# Patient Record
Sex: MALE | Race: WHITE | NOT HISPANIC OR LATINO | Employment: OTHER | URBAN - METROPOLITAN AREA
[De-identification: names, ages, dates, MRNs, and addresses within clinical notes are randomized per-mention and may not be internally consistent; named-entity substitution may affect disease eponyms.]

---

## 2023-12-06 ENCOUNTER — TELEPHONE (OUTPATIENT)
Age: 68
End: 2023-12-06

## 2023-12-06 NOTE — TELEPHONE ENCOUNTER
Is January 12th appropriate for this patient, elevated PSA. Decision tree states within 2 weeks but nothing available.

## 2023-12-06 NOTE — TELEPHONE ENCOUNTER
I called and confirmed appt with patient. Date, time, and location has been reviewed. Patient was given fax number to get records sent here. Clerical will continue to watch for records.

## 2023-12-06 NOTE — TELEPHONE ENCOUNTER
New Patient    What is the reason for the patient’s appointment?: NP- Elevated PSA 17.39    What office location does the patient prefer?: [facility] if possible     Does patient have Imaging/Lab Results:    Patient states he had his PSA done on 12/4/23 and his PSA was 17.39    Have patient records been requested?:  If No, are the records showing in Epic:  Patient will be having records faxed in ASAP    HISTORY:   Has the patient had any previous Urologist(s)?: No         Denial Instructions   Send patient to Nurse Triage. I did attempt to schedule patient but got denial above per tree. Patient is going to be calling PCP for records to be sent over ASAP. Please monitor for records and schedule patient in appropriate time frame.      CB: [phone]

## 2023-12-08 NOTE — TELEPHONE ENCOUNTER
I called and let patient know we still did not get records yet. He will call them and let them know.

## 2023-12-11 ENCOUNTER — OFFICE VISIT (OUTPATIENT)
Dept: UROLOGY | Facility: CLINIC | Age: 68
End: 2023-12-11
Payer: MEDICARE

## 2023-12-11 VITALS
HEART RATE: 63 BPM | SYSTOLIC BLOOD PRESSURE: 148 MMHG | BODY MASS INDEX: 27.67 KG/M2 | DIASTOLIC BLOOD PRESSURE: 80 MMHG | WEIGHT: 182 LBS | OXYGEN SATURATION: 97 %

## 2023-12-11 DIAGNOSIS — R97.20 ELEVATED PSA: Primary | ICD-10-CM

## 2023-12-11 DIAGNOSIS — N20.0 NEPHROLITHIASIS: ICD-10-CM

## 2023-12-11 DIAGNOSIS — N41.9 PROSTATITIS, UNSPECIFIED PROSTATITIS TYPE: ICD-10-CM

## 2023-12-11 LAB
POST-VOID RESIDUAL VOLUME, ML POC: 43 ML
SL AMB  POCT GLUCOSE, UA: NORMAL
SL AMB LEUKOCYTE ESTERASE,UA: NORMAL
SL AMB POCT BILIRUBIN,UA: NORMAL
SL AMB POCT BLOOD,UA: NORMAL
SL AMB POCT CLARITY,UA: CLEAR
SL AMB POCT COLOR,UA: NORMAL
SL AMB POCT KETONES,UA: NORMAL
SL AMB POCT NITRITE,UA: NORMAL
SL AMB POCT PH,UA: 6
SL AMB POCT SPECIFIC GRAVITY,UA: 1.02
SL AMB POCT URINE PROTEIN: NORMAL
SL AMB POCT UROBILINOGEN: 0.2

## 2023-12-11 PROCEDURE — 51798 US URINE CAPACITY MEASURE: CPT | Performed by: PHYSICIAN ASSISTANT

## 2023-12-11 PROCEDURE — 99203 OFFICE O/P NEW LOW 30 MIN: CPT | Performed by: PHYSICIAN ASSISTANT

## 2023-12-11 PROCEDURE — 81002 URINALYSIS NONAUTO W/O SCOPE: CPT | Performed by: PHYSICIAN ASSISTANT

## 2023-12-11 RX ORDER — ICOSAPENT ETHYL 1000 MG/1
CAPSULE ORAL
COMMUNITY

## 2023-12-11 RX ORDER — TADALAFIL 20 MG/1
TABLET ORAL
COMMUNITY

## 2023-12-11 RX ORDER — CIPROFLOXACIN 500 MG/1
TABLET, FILM COATED ORAL
COMMUNITY
Start: 2023-12-05

## 2023-12-11 RX ORDER — ROSUVASTATIN CALCIUM 20 MG/1
20 TABLET, COATED ORAL DAILY
COMMUNITY
Start: 2023-09-26

## 2023-12-11 RX ORDER — FEXOFENADINE HCL 180 MG/1
TABLET ORAL
COMMUNITY

## 2023-12-11 RX ORDER — ASPIRIN 81 MG/1
TABLET ORAL
COMMUNITY
Start: 2023-09-15

## 2023-12-11 RX ORDER — PANTOPRAZOLE SODIUM 40 MG/10ML
INJECTION, POWDER, LYOPHILIZED, FOR SOLUTION INTRAVENOUS
COMMUNITY

## 2023-12-11 NOTE — TELEPHONE ENCOUNTER
Pt called questioning why he is not seeing Christiane Berrios today I informed him he was scheduled 1st available npt urgent which was with Shelby Platt PA-C who works closely with the physicians in the office.

## 2023-12-11 NOTE — PROGRESS NOTES
1. Elevated PSA  POCT urine dip    POCT Measure PVR    PSA Total, Diagnostic    Basic metabolic panel      2. Prostatitis, unspecified prostatitis type  Urinalysis with microscopic    Urine culture      3. Nephrolithiasis  US kidney and bladder          Assessment and plan:     1. Prostatitis  - s/p 10 days of bactrim  - currently on 21 days of cipro  - symptoms improving.  - will complete antibiotics. Encouraged hydration, probiotics, and bowel management  - f/u UA with microscopy and urine culture 2 weeks after completing antibiotic to ensure resolution    2. Elevated PSA  - obtained at time of prostatitis  - DUTCH negative for any overt abnormalities  - will repeat diagnostic PSA 2 weeks after completion of antibiotics. If still elevated, mpMRI for further evaluation    3. History of nephroltihaisis  - update renal US    Alisson Pantoja PA-C      Chief Complaint     LUTS    History of Present Illness     Crystal Greenfield is a 76 y.o. male presenting today for consultation. Patient was recently seen at an urgent care secondary to lower urinary tract symptoms. He had complaints of urinary frequency, urinary hesitancy, and intermittent incontinence. Denied any dysuria or gross hematuria. Was febrile at 100.4 in the office at that time. Urine testing ultimately revealed E. coli. He was on a 10-day course of Bactrim. His symptoms improved somewhat then returned. Restarted on cipro, currently 1 week in with another 2 week duration. Symptoms improved. No prior history of urinary tract infections or  surgical manipulation. History of nephrolithiasis. Urine dip in the office today is leukocyte and nitrite negative, trace blood. Clear yellow in color. PVR 43mL    Review of Systems     Review of Systems   Constitutional:  Negative for activity change, appetite change, chills, diaphoresis, fatigue, fever and unexpected weight change. Respiratory:  Negative for chest tightness and shortness of breath. Cardiovascular:  Negative for chest pain, palpitations and leg swelling. Gastrointestinal:  Negative for abdominal distention, abdominal pain, constipation, diarrhea, nausea and vomiting. Genitourinary:  Negative for decreased urine volume, difficulty urinating, dysuria, enuresis, flank pain, frequency, genital sores, hematuria and urgency. Musculoskeletal:  Negative for back pain, gait problem and myalgias. Skin:  Negative for color change, pallor, rash and wound. Psychiatric/Behavioral:  Negative for behavioral problems. The patient is not nervous/anxious. Allergies     Allergies   Allergen Reactions    Shellfish Allergy - Food Allergy Anaphylaxis       Physical Exam     Physical Exam  Constitutional:       General: He is not in acute distress. Appearance: Normal appearance. He is normal weight. He is not ill-appearing, toxic-appearing or diaphoretic. HENT:      Head: Normocephalic and atraumatic. Eyes:      General:         Right eye: No discharge. Left eye: No discharge. Conjunctiva/sclera: Conjunctivae normal.   Pulmonary:      Effort: Pulmonary effort is normal. No respiratory distress. Genitourinary:     Comments: Good rectal tone. Prostate 50g, smooth, symmetric, no nodules  Musculoskeletal:         General: No swelling or tenderness. Normal range of motion. Skin:     General: Skin is warm and dry. Coloration: Skin is not jaundiced or pale. Neurological:      General: No focal deficit present. Mental Status: He is alert and oriented to person, place, and time. Psychiatric:         Mood and Affect: Mood normal.         Behavior: Behavior normal.         Thought Content:  Thought content normal.       Vital Signs     Vitals:    12/11/23 1218   BP: 148/80   BP Location: Left arm   Patient Position: Sitting   Cuff Size: Adult   Pulse: 63   SpO2: 97%   Weight: 82.6 kg (182 lb)         Current Medications       Current Outpatient Medications:     aspirin (ECOTRIN LOW STRENGTH) 81 mg EC tablet, Take by mouth, Disp: , Rfl:     ciprofloxacin (CIPRO) 500 mg tablet, , Disp: , Rfl:     fexofenadine (Allegra Allergy) 180 MG tablet, , Disp: , Rfl:     Icosapent Ethyl 1 g CAPS, , Disp: , Rfl:     pantoprazole (PROTONIX) 40 mg injection, , Disp: , Rfl:     rosuvastatin (CRESTOR) 20 MG tablet, Take 20 mg by mouth daily, Disp: , Rfl:     tadalafil (CIALIS) 20 MG tablet, , Disp: , Rfl:       Active Problems     There is no problem list on file for this patient.         Past Medical History     Past Medical History:   Diagnosis Date    Kidney stone          Surgical History     Past Surgical History:   Procedure Laterality Date    FOOT FRACTURE SURGERY Left 2016    HERNIA REPAIR Bilateral 2004    SHOULDER ARTHROSCOPY W/ BANKHART PROCEDURE Right 1991         Family History     Family History   Problem Relation Age of Onset    Heart block Father         s/p CABG    Cancer Father         adenocarcinoma    Ovarian cancer Mother     Breast cancer Mother     Prostate cancer Paternal Grandfather          Social History     Social History       Radiology Provider Procedure Text (A): After obtaining clear surgical margins the defect was repaired by another provider.

## 2023-12-20 ENCOUNTER — APPOINTMENT (OUTPATIENT)
Dept: RADIOLOGY | Facility: CLINIC | Age: 68
End: 2023-12-20
Payer: MEDICARE

## 2023-12-20 ENCOUNTER — OFFICE VISIT (OUTPATIENT)
Dept: OBGYN CLINIC | Facility: CLINIC | Age: 68
End: 2023-12-20
Payer: MEDICARE

## 2023-12-20 VITALS
BODY MASS INDEX: 27.58 KG/M2 | DIASTOLIC BLOOD PRESSURE: 82 MMHG | HEART RATE: 66 BPM | HEIGHT: 68 IN | WEIGHT: 182 LBS | SYSTOLIC BLOOD PRESSURE: 178 MMHG

## 2023-12-20 DIAGNOSIS — M25.511 RIGHT SHOULDER PAIN, UNSPECIFIED CHRONICITY: ICD-10-CM

## 2023-12-20 DIAGNOSIS — M25.511 RIGHT SHOULDER PAIN, UNSPECIFIED CHRONICITY: Primary | ICD-10-CM

## 2023-12-20 DIAGNOSIS — M19.011 PRIMARY OSTEOARTHRITIS OF RIGHT SHOULDER: ICD-10-CM

## 2023-12-20 PROCEDURE — 20610 DRAIN/INJ JOINT/BURSA W/O US: CPT | Performed by: ORTHOPAEDIC SURGERY

## 2023-12-20 PROCEDURE — 99203 OFFICE O/P NEW LOW 30 MIN: CPT | Performed by: ORTHOPAEDIC SURGERY

## 2023-12-20 PROCEDURE — 73030 X-RAY EXAM OF SHOULDER: CPT

## 2023-12-20 RX ADMIN — TRIAMCINOLONE ACETONIDE 40 MG: 40 INJECTION, SUSPENSION INTRA-ARTICULAR; INTRAMUSCULAR at 10:30

## 2023-12-20 NOTE — PROGRESS NOTES
"Assessment/Plan:  1. Right shoulder pain, unspecified chronicity  XR shoulder 2+ vw right      2. Primary osteoarthritis of right shoulder  Large joint arthrocentesis: R glenohumeral    triamcinolone acetonide (KENALOG-40) 40 mg/mL injection 40 mg        68 year old male with right shoulder glenohumeral arthritis, which is quite predictable given his history of recurrent dislocations in the past. His strength is quite good, and thus I do not suspect a RTC tear at this time. We discussed both operative and nonoperative treatment options today. He  is interested in repeat corticosteroid injection today as he has responded well to this in the past. He consented to right glenohumeral joint injection today and tolerated this well. We discussed possible total shoulder arthroplasty in the future, however he is not interested in this until he retires. He will FU in 6 months for repeat evaluation.     Large joint arthrocentesis: R glenohumeral  Universal Protocol:  Risks and benefits: risks, benefits and alternatives were discussed  Consent given by: patient  Time out: Immediately prior to procedure a \"time out\" was called to verify the correct patient, procedure, equipment, support staff and site/side marked as required.  Timeout called at: 12/20/2023 11:14 AM.  Site marked: the operative site was marked  Supporting Documentation  Indications: pain   Procedure Details  Location: shoulder - R glenohumeral  Preparation: Patient was prepped and draped in the usual sterile fashion  Needle size: 22 G  Ultrasound guidance: no  Approach: posterior  Medications administered: 40 mg triamcinolone acetonide 40 mg/mL (4 ml .5% marcaine)    Patient tolerance: patient tolerated the procedure well with no immediate complications  Dressing:  Sterile dressing applied            Subjective:   Owen Gama is a 68 y.o. male who presents today for evaluation of his right shoulder. He had an open bankart repair of this shoulder back in 1997 for " recurrent shoulder dislocations. He has done well with this surgical procedure but over the last couple years had progressively worsening pain about the shoulder. He did see another provider for this and was diagnosed with shoulder arthritis. He had a corticosteroid injection for this shoulder last September, which did provide significant improvement for at least 6 months. Most of his pain is about the posterior shoulder, and bothers him the most when he is trying to sleep. He notes some limitations in ROM of the shoulder but good strength. He denies any paresthesias of the right upper extremity. 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   The patient did have an MRI of the left shoulder which showed RTC arthropathy.     Review of Systems   Constitutional: Negative.  Negative for chills and fever.   HENT: Negative.  Negative for ear pain and sore throat.    Eyes: Negative.  Negative for pain and redness.   Respiratory: Negative.  Negative for shortness of breath and wheezing.    Cardiovascular:  Negative for chest pain and palpitations.   Gastrointestinal: Negative.  Negative for abdominal pain and blood in stool.   Endocrine: Negative.  Negative for polydipsia and polyuria.   Genitourinary: Negative.  Negative for difficulty urinating and dysuria.   Musculoskeletal:         As noted in HPI   Skin: Negative.  Negative for pallor and rash.   Neurological: Negative.  Negative for dizziness and numbness.   Hematological: Negative.  Negative for adenopathy. Does not bruise/bleed easily.   Psychiatric/Behavioral: Negative.  Negative for confusion and suicidal ideas.          Past Medical History:   Diagnosis Date   • Kidney stone        Past Surgical History:   Procedure Laterality Date   • FOOT FRACTURE SURGERY Left 2016   • HERNIA REPAIR Bilateral 2004   • SHOULDER ARTHROSCOPY W/ BANKHART PROCEDURE Right 1991       Family History   Problem Relation Age of Onset   • Heart block Father         s/p CABG   •  Cancer Father         adenocarcinoma   • Ovarian cancer Mother    • Breast cancer Mother    • Prostate cancer Paternal Grandfather        Social History     Occupational History   • Not on file   Tobacco Use   • Smoking status: Never   • Smokeless tobacco: Never   Vaping Use   • Vaping status: Never Used   Substance and Sexual Activity   • Alcohol use: Yes     Comment: social   • Drug use: Never   • Sexual activity: Yes     Partners: Female         Current Outpatient Medications:   •  aspirin (ECOTRIN LOW STRENGTH) 81 mg EC tablet, Take by mouth, Disp: , Rfl:   •  ciprofloxacin (CIPRO) 500 mg tablet, , Disp: , Rfl:   •  fexofenadine (Allegra Allergy) 180 MG tablet, , Disp: , Rfl:   •  Icosapent Ethyl 1 g CAPS, , Disp: , Rfl:   •  pantoprazole (PROTONIX) 40 mg injection, , Disp: , Rfl:   •  rosuvastatin (CRESTOR) 20 MG tablet, Take 20 mg by mouth daily, Disp: , Rfl:   •  tadalafil (CIALIS) 20 MG tablet, , Disp: , Rfl:     Allergies   Allergen Reactions   • Shellfish Allergy - Food Allergy Anaphylaxis       Objective:  Vitals:    12/20/23 1028   BP: (!) 178/82   Pulse: 66     Pain Score:   5      Right Shoulder Exam     Tenderness   The patient is experiencing no tenderness.    Range of Motion   External rotation:  20   Forward flexion:  170   Internal rotation 0 degrees:  L5     Muscle Strength   Abduction: 5/5   Internal rotation: 5/5   External rotation: 5/5   Supraspinatus: 5/5   Subscapularis: 5/5   Biceps: 5/5     Tests   Drop arm: negative    Other   Erythema: absent  Sensation: normal  Pulse: present      Left Shoulder Exam     Tenderness   The patient is experiencing no tenderness.     Range of Motion   External rotation:  60   Forward flexion:  180   Internal rotation 0 degrees:  T7     Muscle Strength   Abduction: 5/5   Internal rotation: 5/5   External rotation: 4/5     Tests   Drop arm: negative    Other   Erythema: absent  Sensation: normal     Comments:  Haris deformity.             Physical  Exam  Constitutional:       General: He is not in acute distress.     Appearance: He is well-developed.   HENT:      Head: Normocephalic and atraumatic.   Eyes:      General: No scleral icterus.     Conjunctiva/sclera: Conjunctivae normal.   Neck:      Vascular: No JVD.   Cardiovascular:      Rate and Rhythm: Normal rate.   Pulmonary:      Effort: Pulmonary effort is normal. No respiratory distress.   Musculoskeletal:      Comments: As per HPI   Skin:     General: Skin is warm.   Neurological:      Mental Status: He is alert and oriented to person, place, and time.      Coordination: Coordination normal.         I have personally reviewed pertinent films in PACS and my interpretation is as follows:  Xrays right shoulder: Prior surgical anchors from bankart repair. Severe glenohumeral joint arthritis.       This document was created using speech voice recognition software.   Grammatical errors, random word insertions, pronoun errors, and incomplete sentences are an occasional consequence of this system due to software limitations, ambient noise, and hardware issues.   Any formal questions or concerns about content, text, or information contained within the body of this dictation should be directly addressed to the provider for clarification.

## 2023-12-21 RX ORDER — TRIAMCINOLONE ACETONIDE 40 MG/ML
40 INJECTION, SUSPENSION INTRA-ARTICULAR; INTRAMUSCULAR
Status: COMPLETED | OUTPATIENT
Start: 2023-12-20 | End: 2023-12-20

## 2024-01-05 ENCOUNTER — HOSPITAL ENCOUNTER (OUTPATIENT)
Dept: ULTRASOUND IMAGING | Facility: HOSPITAL | Age: 69
Discharge: HOME | End: 2024-01-05
Payer: MEDICARE

## 2024-01-05 DIAGNOSIS — N20.0 NEPHROLITHIASIS: ICD-10-CM

## 2024-01-05 PROCEDURE — 76775 US EXAM ABDO BACK WALL LIM: CPT

## 2024-01-11 ENCOUNTER — APPOINTMENT (OUTPATIENT)
Dept: LAB | Facility: CLINIC | Age: 69
End: 2024-01-11
Payer: MEDICARE

## 2024-01-11 DIAGNOSIS — R97.20 ELEVATED PSA: ICD-10-CM

## 2024-01-11 DIAGNOSIS — N41.9 PROSTATITIS, UNSPECIFIED PROSTATITIS TYPE: ICD-10-CM

## 2024-01-11 LAB
ANION GAP SERPL CALCULATED.3IONS-SCNC: 6 MMOL/L
BACTERIA UR QL AUTO: ABNORMAL /HPF
BILIRUB UR QL STRIP: NEGATIVE
BUN SERPL-MCNC: 20 MG/DL (ref 5–25)
CALCIUM SERPL-MCNC: 9.1 MG/DL (ref 8.4–10.2)
CHLORIDE SERPL-SCNC: 101 MMOL/L (ref 96–108)
CLARITY UR: CLEAR
CO2 SERPL-SCNC: 31 MMOL/L (ref 21–32)
COLOR UR: ABNORMAL
CREAT SERPL-MCNC: 0.85 MG/DL (ref 0.6–1.3)
GFR SERPL CREATININE-BSD FRML MDRD: 89 ML/MIN/1.73SQ M
GLUCOSE SERPL-MCNC: 88 MG/DL (ref 65–140)
GLUCOSE UR STRIP-MCNC: NEGATIVE MG/DL
HGB UR QL STRIP.AUTO: NEGATIVE
KETONES UR STRIP-MCNC: NEGATIVE MG/DL
LEUKOCYTE ESTERASE UR QL STRIP: NEGATIVE
NITRITE UR QL STRIP: NEGATIVE
NON-SQ EPI CELLS URNS QL MICRO: ABNORMAL /HPF
PH UR STRIP.AUTO: 6 [PH]
POTASSIUM SERPL-SCNC: 3.9 MMOL/L (ref 3.5–5.3)
PROT UR STRIP-MCNC: NEGATIVE MG/DL
PSA SERPL-MCNC: 4.71 NG/ML (ref 0–4)
RBC #/AREA URNS AUTO: ABNORMAL /HPF
SODIUM SERPL-SCNC: 138 MMOL/L (ref 135–147)
SP GR UR STRIP.AUTO: 1.02 (ref 1–1.03)
UROBILINOGEN UR STRIP-ACNC: <2 MG/DL
WBC #/AREA URNS AUTO: ABNORMAL /HPF

## 2024-01-11 PROCEDURE — 84153 ASSAY OF PSA TOTAL: CPT

## 2024-01-11 PROCEDURE — 87086 URINE CULTURE/COLONY COUNT: CPT

## 2024-01-11 PROCEDURE — 36415 COLL VENOUS BLD VENIPUNCTURE: CPT

## 2024-01-11 PROCEDURE — 81001 URINALYSIS AUTO W/SCOPE: CPT

## 2024-01-11 PROCEDURE — 80048 BASIC METABOLIC PNL TOTAL CA: CPT

## 2024-01-12 ENCOUNTER — TELEPHONE (OUTPATIENT)
Age: 69
End: 2024-01-12

## 2024-01-12 LAB — BACTERIA UR CULT: NORMAL

## 2024-01-12 NOTE — TELEPHONE ENCOUNTER
Per communication sheet I left a voicemail to inform patient that per the AP the PSA will be discussed at his upcoming appointment.

## 2024-01-12 NOTE — TELEPHONE ENCOUNTER
----- Message from Ariana Shah PA-C sent at 1/12/2024  7:52 AM EST -----  Will be reviewed at follow up appointment

## 2024-01-19 RX ORDER — PANTOPRAZOLE SODIUM 40 MG/1
40 TABLET, DELAYED RELEASE ORAL DAILY
COMMUNITY
Start: 2023-12-12

## 2024-01-22 ENCOUNTER — OFFICE VISIT (OUTPATIENT)
Dept: UROLOGY | Facility: CLINIC | Age: 69
End: 2024-01-22
Payer: MEDICARE

## 2024-01-22 VITALS
HEART RATE: 66 BPM | DIASTOLIC BLOOD PRESSURE: 70 MMHG | HEIGHT: 68 IN | RESPIRATION RATE: 14 BRPM | BODY MASS INDEX: 27.58 KG/M2 | OXYGEN SATURATION: 97 % | WEIGHT: 182 LBS | SYSTOLIC BLOOD PRESSURE: 136 MMHG

## 2024-01-22 DIAGNOSIS — N20.0 KIDNEY STONES: ICD-10-CM

## 2024-01-22 DIAGNOSIS — R97.20 ELEVATED PSA: Primary | ICD-10-CM

## 2024-01-22 PROCEDURE — 99213 OFFICE O/P EST LOW 20 MIN: CPT | Performed by: PHYSICIAN ASSISTANT

## 2024-01-22 NOTE — PROGRESS NOTES
"  UROLOGY PROGRESS NOTE   Patient Identifiers: Owen Gama (MRN [id number])  Date of Service: 1/22/2024    Subjective:   68-year-old man seen in December with acute prostatitis.  He had extremely high PSA of 17.  Now down to 4.71 after treatment.  Ultrasound shows bilateral nonobstructing renal stones measuring up to 6 mm.  He has been asymptomatic.    Reason for visit: Prostatitis and kidney stone follow-up    Objective:     VITALS:    There were no vitals filed for this visit.        LABS:  No results found for: \"HGB\", \"HCT\", \"WBC\", \"PLT\"]    Lab Results   Component Value Date    K 3.9 01/11/2024     01/11/2024    CO2 31 01/11/2024    BUN 20 01/11/2024    CREATININE 0.85 01/11/2024    CALCIUM 9.1 01/11/2024   ]        INPATIENT MEDS:    Current Outpatient Medications:     aspirin (ECOTRIN LOW STRENGTH) 81 mg EC tablet, Take by mouth, Disp: , Rfl:     fexofenadine (Allegra Allergy) 180 MG tablet, , Disp: , Rfl:     Icosapent Ethyl 1 g CAPS, , Disp: , Rfl:     pantoprazole (PROTONIX) 40 mg injection, , Disp: , Rfl:     pantoprazole (PROTONIX) 40 mg tablet, Take 40 mg by mouth daily, Disp: , Rfl:     rosuvastatin (CRESTOR) 20 MG tablet, Take 20 mg by mouth daily, Disp: , Rfl:     tadalafil (CIALIS) 20 MG tablet, , Disp: , Rfl:     ciprofloxacin (CIPRO) 500 mg tablet, , Disp: , Rfl:       Physical Exam:   There were no vitals taken for this visit.  GEN: no acute distress    RESP: breathing comfortably with no accessory muscle use    ABD: soft, non-tender, non-distended   INCISION:    EXT: no significant peripheral edema         RADIOLOGY:   IMPRESSION:     1. Bilateral nonobstructing renal calculi measuring up to 6 mm.  2. Simple right renal cyst.    Assessment:   #1.  Prostatitis with elevated PSA  #2.  Nephrolithiasis    Plan:   -Follow-up in 6 months with PSA and KUB prior to visit  -  -  -          "

## 2024-02-22 ENCOUNTER — APPOINTMENT (OUTPATIENT)
Dept: RADIOLOGY | Facility: CLINIC | Age: 69
End: 2024-02-22
Payer: MEDICARE

## 2024-02-22 ENCOUNTER — OFFICE VISIT (OUTPATIENT)
Dept: URGENT CARE | Facility: CLINIC | Age: 69
End: 2024-02-22

## 2024-02-22 ENCOUNTER — TELEPHONE (OUTPATIENT)
Dept: URGENT CARE | Facility: CLINIC | Age: 69
End: 2024-02-22

## 2024-02-22 VITALS
SYSTOLIC BLOOD PRESSURE: 140 MMHG | HEART RATE: 58 BPM | BODY MASS INDEX: 27.98 KG/M2 | TEMPERATURE: 97.6 F | RESPIRATION RATE: 14 BRPM | DIASTOLIC BLOOD PRESSURE: 88 MMHG | OXYGEN SATURATION: 100 % | WEIGHT: 184 LBS

## 2024-02-22 DIAGNOSIS — T14.8XXA BITE BY ANIMAL: ICD-10-CM
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
## 2024-02-22 DIAGNOSIS — Z23 ENCOUNTER FOR IMMUNIZATION: ICD-10-CM

## 2024-02-22 DIAGNOSIS — S41.132A PUNCTURE WOUND OF MULTIPLE SITES OF LEFT UPPER EXTREMITY, INITIAL ENCOUNTER: Primary | ICD-10-CM

## 2024-02-22 PROCEDURE — 73090 X-RAY EXAM OF FOREARM: CPT

## 2024-02-22 RX ORDER — AMOXICILLIN AND CLAVULANATE POTASSIUM 875; 125 MG/1; MG/1
1 TABLET, FILM COATED ORAL 2 TIMES DAILY WITH MEALS
Qty: 10 TABLET | Refills: 0 | Status: SHIPPED | OUTPATIENT
Start: 2024-02-22 | End: 2024-02-27

## 2024-02-22 NOTE — TELEPHONE ENCOUNTER
Call from pharmacy [facility]  to verify  RX  given by the [facility]. Reviewed with provider Eladio Ballesteros and [facility]. Ok to fill. Issues because it is not on  RX paper.

## 2024-02-22 NOTE — PROGRESS NOTES
[facility]        NAME: Owen Gama is a 68 y.o. male  : 1955    MRN: [id number]  DATE: 2024  TIME: 2:00 PM    Assessment and Plan   Puncture wound of multiple sites of left upper extremity, initial encounter [S41.132A]  1. Puncture wound of multiple sites of left upper extremity, initial encounter  XR forearm 2 vw left    amoxicillin-clavulanate (AUGMENTIN) 875-125 mg per tablet    Td Vaccine (greater than or equal to 6yo) Preservative Free IM      2. Dog bite, initial encounter  amoxicillin-clavulanate (AUGMENTIN) 875-125 mg per tablet      3. Encounter for immunization  Td Vaccine (greater than or equal to 6yo) Preservative Free IM        X-ray of left forearm with no acute osseous abnormality or FBs per my interpretation. Tetanus booster given in the office. Prescription for Augmentin given. Wound care discussed. Discussed strict return to care precautions as well as red flag symptoms which should prompt immediate ED referral. Pt verbalized understanding and is in agreement with plan.  Please follow up with your primary care provider within the next week. Please remember that your visit today was with an urgent care provider and should not replace follow up with your primary care provider for chronic medical issues or annual physicals.       Patient Instructions       Follow up with PCP in 3-5 days.  Proceed to  ER if symptoms worsen.    Chief Complaint     Chief Complaint   Patient presents with    Animal Bite     Pt presents with dog bite on the left forearm, owners dog/ shots UTD; bite happened last night, swelling/redness noted at site/ bleeding under control         History of Present Illness       Owen is a 68 year old male with a PMH of CAD who presents to urgent care for dog bite injury that occurred yesterday. Pt states he was sitting on the couch with his dog last night when he reached over to pet the dog and it got spooked and bit his left arm/hand. He states he washed  the wounds with soap and water and put ice on the area. Bleeding was easily controlled with pressure. This morning patient developed redness around the area and notes it is sore. The dog is up to date on vaccines. Denies fever, numbness, tingling, and weakness. Unsure last tetanus.        Review of Systems   Review of Systems   Constitutional:  Negative for chills and fever.   Respiratory:  Negative for shortness of breath.    Cardiovascular:  Negative for chest pain and palpitations.   Skin:  Positive for color change and wound.   Neurological:  Negative for weakness and numbness.         Current Medications       Current Outpatient Medications:     amoxicillin-clavulanate (AUGMENTIN) 875-125 mg per tablet, Take 1 tablet by mouth 2 (two) times a day with meals for 5 days, Disp: 10 tablet, Rfl: 0    aspirin (ECOTRIN LOW STRENGTH) 81 mg EC tablet, Take by mouth, Disp: , Rfl:     fexofenadine (Allegra Allergy) 180 MG tablet, , Disp: , Rfl:     Icosapent Ethyl 1 g CAPS, , Disp: , Rfl:     pantoprazole (PROTONIX) 40 mg injection, , Disp: , Rfl:     rosuvastatin (CRESTOR) 20 MG tablet, Take 20 mg by mouth daily, Disp: , Rfl:     tadalafil (CIALIS) 20 MG tablet, , Disp: , Rfl:     ciprofloxacin (CIPRO) 500 mg tablet, , Disp: , Rfl:     pantoprazole (PROTONIX) 40 mg tablet, Take 40 mg by mouth daily (Patient not taking: Reported on 2/22/2024), Disp: , Rfl:     Current Allergies     Allergies as of 02/22/2024 - Reviewed 02/22/2024   Allergen Reaction Noted    Shellfish allergy - food allergy Anaphylaxis 09/30/2022            The following portions of the patient's history were reviewed and updated as appropriate: allergies, current medications, past family history, past medical history, past social history, past surgical history and problem list.     Past Medical History:   Diagnosis Date    Coronary artery disease due to lipid rich plaque     Kidney stone        Past Surgical History:   Procedure Laterality Date    FOOT  FRACTURE SURGERY Left 2016    HERNIA REPAIR Bilateral 2004    SHOULDER ARTHROSCOPY W/ BANKHART PROCEDURE Right 1991       Family History   Problem Relation Age of Onset    Ovarian cancer Mother     Breast cancer Mother     Heart block Father         s/p CABG    Cancer Father         adenocarcinoma    Prostate cancer Paternal Grandfather          Medications have been verified.        Objective   /88   Pulse 58   Temp 97.6 °F (36.4 °C)   Resp 14   Wt 83.5 kg (184 lb)   SpO2 100%   BMI 27.98 kg/m²        Physical Exam     Physical Exam  Constitutional:       General: He is not in acute distress.     Appearance: Normal appearance.   Cardiovascular:      Rate and Rhythm: Normal rate and regular rhythm.      Heart sounds: Normal heart sounds. No murmur heard.     No gallop.   Pulmonary:      Effort: Pulmonary effort is normal. No respiratory distress.      Breath sounds: Normal breath sounds. No wheezing, rhonchi or rales.   Musculoskeletal:      Left forearm: Laceration (small laceration on dorsal aspect of distal forearm with surrounding erythema, no active bleeding) and tenderness present. No deformity or bony tenderness.      Left wrist: Normal. No bony tenderness. Normal range of motion.      Left hand: Laceration (multiple superficial dorsal lacerations, no active bleeding) present. No swelling, deformity or bony tenderness (No bony or surrounding tenderness). Normal range of motion. Normal strength. Normal sensation. Normal pulse.   Skin:     General: Skin is warm and dry.      Capillary Refill: Capillary refill takes less than 2 seconds.   Neurological:      Mental Status: He is alert and oriented to person, place, and time.

## 2024-06-11 ENCOUNTER — OFFICE VISIT (OUTPATIENT)
Dept: OBGYN CLINIC | Facility: CLINIC | Age: 69
End: 2024-06-11
Payer: MEDICARE

## 2024-06-11 VITALS
HEART RATE: 60 BPM | SYSTOLIC BLOOD PRESSURE: 161 MMHG | BODY MASS INDEX: 27.89 KG/M2 | DIASTOLIC BLOOD PRESSURE: 85 MMHG | HEIGHT: 68 IN | WEIGHT: 184 LBS

## 2024-06-11 DIAGNOSIS — M19.011 PRIMARY OSTEOARTHRITIS OF RIGHT SHOULDER: Primary | ICD-10-CM

## 2024-06-11 PROCEDURE — 20610 DRAIN/INJ JOINT/BURSA W/O US: CPT | Performed by: ORTHOPAEDIC SURGERY

## 2024-06-11 PROCEDURE — 99213 OFFICE O/P EST LOW 20 MIN: CPT | Performed by: ORTHOPAEDIC SURGERY

## 2024-06-11 RX ADMIN — LIDOCAINE HYDROCHLORIDE 4 ML: 10 INJECTION, SOLUTION INFILTRATION; PERINEURAL at 10:30

## 2024-06-11 RX ADMIN — TRIAMCINOLONE ACETONIDE 40 MG: 40 INJECTION, SUSPENSION INTRA-ARTICULAR; INTRAMUSCULAR at 10:30

## 2024-06-11 NOTE — PATIENT INSTRUCTIONS
EXERCISES FOR SHOULDER REHABILITATION: INCREASED FLEXIBILITY AND STRENGTH     All of the exercises listed are to be done with slow and steady movements and controlled breathing. Do only what you feel comfortable doing.   1. CODMAN’S/ PENDULUM       1. Lean forward and place one hand on a counter or table for support. Let your other arm hang freely at your side.  2. Gently swing your arm forward and back. Repeat the exercise moving your arm side-to-side, and repeat again in a circular motion.  3. Keep from rounding your back or locking your knees.  4. Repeat 10 times.  5. Complete 2-3 sets.  2. CROSSOVER ARM STRETCH       1. Standing upright, relax your shoulders and pull one arm across your body as far as possible. Hold at your upper arm.  2. Hold the stretch for 15-30 seconds.  3. Repeat the stretch for your other arm.  4. Repeat the stretch 3 times for each arm.  3. ACTIVE ASSISTIVE ROM WITH STICK     1. Keep your affected arm relaxed, do not lift your affected arm on its own.  2. Move through the motions slowly.    Flexion:  1. Hold a stick with your hands shoulder width apart.  2. Slowly raise your arms up out in front of you. Relax your affected arm allowing your unaffected arm to lift your affected arm.  3. After holding for 3-5 seconds at the end range, slowly return back down.  4. Repeat 10 times.    Extension:   1. Hold a stick at your side with your affected arm at your side.  2. Slowly push your affected arm backwards behind you. Keep your body upright and your affected arm relaxed.  3. After holding for 3-5 seconds at the end range, slowly return back down.  4. Repeat 10 times.    Abduction:  1. Hold a stick with your hands shoulder width apart.  2. Slowly push your affected arm to the side of you. Completely relax your affected arm.  3. After holding for 3-5 seconds at the end range, slowly return back down.  4. Repeat 10 times.    Internal Rotation/Extension:  1. Hold a stick with your hands as close  as possible behind your body.  2. Slowly raise your affected arm up, bringing your affected arm up with it. Relax your affected arm as much as possible.  3. After holding for 3-5 seconds at the end range, slowly return back down.  4. Repeat 10 times.    Passive Internal Rotation:  1. Hold a stick with your hands shoulder width apart behind your back.  2. Slowly pull your affected arm behind your body. Completely relax your affected arm.  3. After holding for 3-5 seconds at the end range, slowly return back down.  4. Repeat 10 times.     Passive External Rotation:  1. Hold a stick with one hand and cup the other end of the stick with your other hand.  2. Slowly push your affected arm outward horizontally.  3. After holding for 3-5 seconds at the end range, slowly return back down.  4. Repeat 10 times.  4. TOWEL STRETCH INTERNAL ROTATION / EXTENSION       1. Hold a towel behind your back. Affected arm at the bottom.  2. Slowly elevate your affected arm by pulling up with your unaffected arm.  3. Hold for 20-30 seconds at the maximum pain free range, then relax for 30 seconds.  4. Repeat 3-6 times.  5. SLEEPER STRETCH       1. Lay on your side on a firm surface with your affected arm under you as shown. Flex your elbow to 90 degrees.  2. Slowly press down on your forearm with the opposite arm, stopping when you feel a stretch.  3. Hold for 20-30 seconds at the maximum pain free range, then relax for 30 seconds.  4. Repeat 3-6 times.  6. STANDING ROW       1. Attach a band to a doorknob or other steady surface. You may tie the ends of the band together to create a loop.  2. Stand upright with your arm at a 90 degree angle at your side.  3. Keeping your arm tucked at your side, slowly pull your elbow straight backwards.  4. Slowly return to the start position, repeat 8-12 times.  5. Complete 3 sets.  7. EXTERNAL ROTATION WITH ARM ABDUCTED 90°       1. Attach a band to a doorknob or other steady surface. You may tie the  ends of the band together to create a loop.  2. Stand upright with your arm at a 90 degree angle and at shoulder height.  3. Keeping your shoulder and elbow at an even level, slowly raise your hand until it is facing upwards, or even with your head.  4. Slowly return to the start position, repeat 8-12 times.  5. Complete 3 sets.  8. INTERNAL ROTATION WITH BAND       1. Attach a band to a doorknob or other steady surface. You may tie the ends of the band together to create a loop.  2. Stand perpendicular to the band with your arm at a 90 degree angle and tucked at your side.  3. Keeping your elbow tucked, slowly rotate your hand inward.  4. Slowly return to the start position, repeat 8-12 times.  5. Complete 3 sets.  9. EXTERNAL ROTATION WITH BAND       1. Attach a band to a doorknob or other steady surface. You may tie the ends of the band together to create a loop.  2. Stand perpendicular to the band with your arm at a 90 degree angle and tucked at your side.  3. Keeping your elbow tucked, slowly rotate your hand outward.  4. Slowly return to the start position, repeat 8-12 times.  5. Complete 3 sets.  10. ELBOW FLEXION (BICEP CURL)       1. Standing upright hold a dumbbell in each hand.  2. Keeping your elbow close to your side slowly raise the weight upwards toward your shoulder.  3. Avoid swinging your arm or using momentum.  4. Repeat for 8-12 repetitions.  5. Complete 3 sets.  11. ELBOW EXTENSION (OVERHEAD TRICEP EXTENSION)       1. Standing upright hold a dumbbell over your head. Support your arm by holding your opposite hand on your upper arm.  2. Slowly straighten your elbow and raise the weight overhead.  3. Repeat for 8-12 repetions.  4. Complete 3 sets.  12. STRAIGHT ARM DUMBBELL ROW       1. Place your knee or a chair or bench and lean forward so your that your hand supports your weight. Use a light weight (1-7lbs).  2. Slowly raise the weight behind you parallel to the floor, rotating your hand to a  thumbs-up position. Keep your arm straight.  3. Repeat 15-20 times.  4. Complete 3-4 sets.  13. SCAPULA SETTING       1. Lay on your stomach with your arms at your side. Palms facing downwards.  2. Slowly draw your shoulder blades together and down your back.  3. Ease about intermediate off this position and hold for 10 seconds, then relax for 10 seconds.  4. Repeat 10 times.  14. SCAPULAR RETRACTION/PROTRACTION       1. Lay on your stomach on an edge with your affected arm hanging off the side.  2. Slowly raise your arm keeping your elbow straight by drawing your shoulder blade to the other side. You are not raising your arm straight out to your side, but elevating your arm.  3. Repeat 10 times.  4. Complete 2 sets.  15. BENT-OVER HORIZONTAL ABDUCTION       1. Lay on your stomach on an edge with your affected arm hanging off the side.  2. Slowly raise your arm keeping your elbow straight by raising your arm out to your side. Control the movement.  3. Repeat 10 times.  4. Complete 2 sets.  16. INTERNAL AND EXTERNAL ROTATION       1. Lay on your back on a steady surface.  2. Raise your arm to 90 degrees and lift your fingers to face upwards.  3. Keeping your arm bend, slowly move your arm as shown.  4. Bring your arm to a smaller angle (45 degrees) if 90 degrees hurts.  5. Repeat 20 times.  6. Complete 3-4 sets.  17. EXTERNAL ROTATION       1. Lay on your side on a steady surface with your unaffected arm cradling your head.  2. Hold your arm at a 90 degree angle, keeping your affected arms elbow tucked at your side.  3. Slowly raise your arm to a vertical position and lower the weight slowly.  4. Repeat 10 times.  5. Complete 2-3 sets.  18. INTERNAL ROTATION       1. Lay on your side on a flat surface on the side of the affected arm  2. Hold your arm at a 90 degree angle, keeping your affected arms elbow tucked at your side.  3. Slowly raise your arm to a vertical position and lower the weight slowly.  4. Repeat 10  times.  5. Complete 2-3 sets.

## 2024-06-11 NOTE — PROGRESS NOTES
"Assessment/Plan:  1. Primary osteoarthritis of right shoulder  Ambulatory Referral to Orthopedic Surgery    Large joint arthrocentesis: R glenohumeral    lidocaine (XYLOCAINE) 1 % injection 4 mL    triamcinolone acetonide (KENALOG-40) 40 mg/mL injection 40 mg          The patient has ongoing arthritic related pain about the right shoulder. He is not interested in any surgical intervention until he reitres, which will likely not be for at least another year. We discussed continued conservative treatment in the meantime. He was interested in repeat corticosteroid injection today, however I did advise him that this should be his last injection as he has already at 2 of these. We discussed that too many of these injection can cause degradation of his joint. He consented to and tolerated this injection well today. He is interested in trying PRP injections in the future. I did place a referral to sports medicine for this. I also gave him a physician home exercise program.  He will FU as needed.     Large joint arthrocentesis: R glenohumeral  Universal Protocol:  Risks and benefits: risks, benefits and alternatives were discussed  Consent given by: patient  Time out: Immediately prior to procedure a \"time out\" was called to verify the correct patient, procedure, equipment, support staff and site/side marked as required.  Site marked: the operative site was marked  Supporting Documentation  Indications: pain   Procedure Details  Location: shoulder - R glenohumeral  Preparation: Patient was prepped and draped in the usual sterile fashion  Needle size: 22 G  Ultrasound guidance: no  Approach: anterior  Medications administered: 4 mL lidocaine 1 %; 40 mg triamcinolone acetonide 40 mg/mL (4 ml .5% bupivacaine)    Patient tolerance: patient tolerated the procedure well with no immediate complications  Dressing:  Sterile dressing applied            Subjective:   Owen Gama is a 69 y.o. male who presents today for follow-up of his " right shoulder. He had open bankart repair of the shoulder back in 1997 for recurrent shoulder instability. Xrays last appointment showed significant glenohumeral joint arthritis. He was last seen back in December and was given a corticosteroid injection at that time. This injection provided him relief for about 3 months. His pain has since returned and got quite significant yesterday. He still notes good ROM  and fair strength, but notes diffuse pain about the shoulder, which is worse with activity. He also has to keep his arm in certain positions at night to alleviate his pain. He denies any paresthesias of the upper extremity. He is still very active and continues to work.  He has had 2 total corticosteroid injections in the past for this shoulder.      Review of Systems   Constitutional: Negative.  Negative for chills and fever.   HENT: Negative.  Negative for ear pain and sore throat.    Eyes: Negative.  Negative for pain and redness.   Respiratory: Negative.  Negative for shortness of breath and wheezing.    Cardiovascular:  Negative for chest pain and palpitations.   Gastrointestinal: Negative.  Negative for abdominal pain and blood in stool.   Endocrine: Negative.  Negative for polydipsia and polyuria.   Genitourinary: Negative.  Negative for difficulty urinating and dysuria.   Musculoskeletal:         As noted in HPI   Skin: Negative.  Negative for pallor and rash.   Neurological: Negative.  Negative for dizziness and numbness.   Hematological: Negative.  Negative for adenopathy. Does not bruise/bleed easily.   Psychiatric/Behavioral: Negative.  Negative for confusion and suicidal ideas.          Past Medical History:   Diagnosis Date   • Coronary artery disease due to lipid rich plaque    • Kidney stone        Past Surgical History:   Procedure Laterality Date   • FOOT FRACTURE SURGERY Left 2016   • HERNIA REPAIR Bilateral 2004   • SHOULDER ARTHROSCOPY W/ BANKHART PROCEDURE Right 1991       Family History    Problem Relation Age of Onset   • Ovarian cancer Mother    • Breast cancer Mother    • Heart block Father         s/p CABG   • Cancer Father         adenocarcinoma   • Prostate cancer Paternal Grandfather        Social History     Occupational History   • Not on file   Tobacco Use   • Smoking status: Never     Passive exposure: Past   • Smokeless tobacco: Never   Vaping Use   • Vaping status: Never Used   Substance and Sexual Activity   • Alcohol use: Yes     Comment: social   • Drug use: Never   • Sexual activity: Yes     Partners: Female         Current Outpatient Medications:   •  fexofenadine (Allegra Allergy) 180 MG tablet, , Disp: , Rfl:   •  Icosapent Ethyl 1 g CAPS, , Disp: , Rfl:   •  pantoprazole (PROTONIX) 40 mg injection, , Disp: , Rfl:   •  rosuvastatin (CRESTOR) 20 MG tablet, Take 20 mg by mouth daily, Disp: , Rfl:   •  aspirin (ECOTRIN LOW STRENGTH) 81 mg EC tablet, Take by mouth (Patient not taking: Reported on 6/11/2024), Disp: , Rfl:   •  ciprofloxacin (CIPRO) 500 mg tablet, , Disp: , Rfl:   •  pantoprazole (PROTONIX) 40 mg tablet, Take 40 mg by mouth daily (Patient not taking: Reported on 2/22/2024), Disp: , Rfl:   •  tadalafil (CIALIS) 20 MG tablet, , Disp: , Rfl:   No current facility-administered medications for this visit.    Allergies   Allergen Reactions   • Shellfish Allergy - Food Allergy Anaphylaxis       Objective:  Vitals:    06/11/24 1041   BP: 161/85   Pulse: 60     Pain Score:   6      Right Shoulder Exam     Tenderness   The patient is experiencing no tenderness.    Range of Motion   External rotation:  30   Forward flexion:  170   Internal rotation 0 degrees:  L5   Internal rotation 90 degrees:  20     Muscle Strength   Abduction: 5/5   Internal rotation: 5/5   External rotation: 5/5     Tests   Drop arm: negative    Other   Erythema: absent  Scars: present  Sensation: normal  Pulse: present            Physical Exam  Constitutional:       General: He is not in acute distress.      Appearance: He is well-developed.   HENT:      Head: Normocephalic and atraumatic.   Eyes:      General: No scleral icterus.     Conjunctiva/sclera: Conjunctivae normal.   Neck:      Vascular: No JVD.   Cardiovascular:      Rate and Rhythm: Normal rate.   Pulmonary:      Effort: Pulmonary effort is normal. No respiratory distress.   Musculoskeletal:      Comments: As per HPI   Skin:     General: Skin is warm.   Neurological:      Mental Status: He is alert and oriented to person, place, and time.      Coordination: Coordination normal.           This document was created using speech voice recognition software.   Grammatical errors, random word insertions, pronoun errors, and incomplete sentences are an occasional consequence of this system due to software limitations, ambient noise, and hardware issues.   Any formal questions or concerns about content, text, or information contained within the body of this dictation should be directly addressed to the provider for clarification.

## 2024-06-12 RX ORDER — TRIAMCINOLONE ACETONIDE 40 MG/ML
40 INJECTION, SUSPENSION INTRA-ARTICULAR; INTRAMUSCULAR
Status: COMPLETED | OUTPATIENT
Start: 2024-06-11 | End: 2024-06-11

## 2024-06-12 RX ORDER — LIDOCAINE HYDROCHLORIDE 10 MG/ML
4 INJECTION, SOLUTION INFILTRATION; PERINEURAL
Status: COMPLETED | OUTPATIENT
Start: 2024-06-11 | End: 2024-06-11

## 2024-07-05 ENCOUNTER — TELEPHONE (OUTPATIENT)
Age: 69
End: 2024-07-05

## 2024-07-29 ENCOUNTER — OFFICE VISIT (OUTPATIENT)
Dept: UROLOGY | Facility: CLINIC | Age: 69
End: 2024-07-29
Payer: MEDICARE

## 2024-07-29 ENCOUNTER — APPOINTMENT (OUTPATIENT)
Dept: LAB | Facility: AMBULARY SURGERY CENTER | Age: 69
End: 2024-07-29
Payer: MEDICARE

## 2024-07-29 ENCOUNTER — HOSPITAL ENCOUNTER (OUTPATIENT)
Dept: RADIOLOGY | Facility: HOSPITAL | Age: 69
Discharge: HOME | End: 2024-07-29
Payer: MEDICARE

## 2024-07-29 VITALS
HEART RATE: 65 BPM | BODY MASS INDEX: 28.04 KG/M2 | WEIGHT: 185 LBS | SYSTOLIC BLOOD PRESSURE: 140 MMHG | DIASTOLIC BLOOD PRESSURE: 80 MMHG | HEIGHT: 68 IN | OXYGEN SATURATION: 96 %

## 2024-07-29 DIAGNOSIS — N52.8 OTHER MALE ERECTILE DYSFUNCTION: ICD-10-CM

## 2024-07-29 DIAGNOSIS — N20.0 KIDNEY STONES: ICD-10-CM

## 2024-07-29 DIAGNOSIS — R97.20 ELEVATED PSA: ICD-10-CM

## 2024-07-29 DIAGNOSIS — R97.20 ELEVATED PSA: Primary | ICD-10-CM

## 2024-07-29 LAB — PSA SERPL-MCNC: 2.8 NG/ML (ref 0–4)

## 2024-07-29 PROCEDURE — 74018 RADEX ABDOMEN 1 VIEW: CPT

## 2024-07-29 PROCEDURE — 36415 COLL VENOUS BLD VENIPUNCTURE: CPT

## 2024-07-29 PROCEDURE — 84153 ASSAY OF PSA TOTAL: CPT

## 2024-07-29 PROCEDURE — 99213 OFFICE O/P EST LOW 20 MIN: CPT | Performed by: PHYSICIAN ASSISTANT

## 2024-07-29 NOTE — PROGRESS NOTES
"  UROLOGY PROGRESS NOTE   Patient Identifiers: Owen Gama (MRN [id number])  Date of Service: 7/29/2024    Subjective:   69-year-old man history of acute prostatitis with elevated PSA of 17.  It went down to 4.71.  He also has bilateral nonobstructing kidney stones.  Updated PSA and KUB are pending.    This is reason for visit: Elevated PSA follow-up    Objective:     VITALS:    Vitals:    07/29/24 1313   BP: 140/80   Pulse: 65   SpO2: 96%           LABS:  No results found for: \"HGB\", \"HCT\", \"WBC\", \"PLT\"]    Lab Results   Component Value Date    K 3.9 01/11/2024     01/11/2024    CO2 31 01/11/2024    BUN 20 01/11/2024    CREATININE 0.85 01/11/2024    CALCIUM 9.1 01/11/2024   ]        INPATIENT MEDS:    Current Outpatient Medications:     fexofenadine (Allegra Allergy) 180 MG tablet, , Disp: , Rfl:     Icosapent Ethyl 1 g CAPS, , Disp: , Rfl:     pantoprazole (PROTONIX) 40 mg tablet, Take 40 mg by mouth daily, Disp: , Rfl:     rosuvastatin (CRESTOR) 20 MG tablet, Take 20 mg by mouth daily, Disp: , Rfl:     tadalafil (CIALIS) 20 MG tablet, , Disp: , Rfl:     aspirin (ECOTRIN LOW STRENGTH) 81 mg EC tablet, Take by mouth (Patient not taking: Reported on 7/29/2024), Disp: , Rfl:     ciprofloxacin (CIPRO) 500 mg tablet, , Disp: , Rfl:     pantoprazole (PROTONIX) 40 mg injection, , Disp: , Rfl:       Physical Exam:   /80 (BP Location: Left arm, Patient Position: Sitting, Cuff Size: Adult)   Pulse 65   Ht 5' 8\" (1.727 m)   Wt 83.9 kg (185 lb)   SpO2 96%   BMI 28.13 kg/m²   GEN: no acute distress    RESP: breathing comfortably with no accessory muscle use    ABD: soft, non-tender, non-distended   INCISION:    EXT: no significant peripheral edema     RADIOLOGY:   IMPRESSION:     1. Bilateral nonobstructing renal calculi measuring up to 6 mm.  2. Simple right renal cyst.    Assessment:   #1.  Elevated PSA  #2.  Nephrolithiasis  #3.  Erectile dysfunction    Plan:   -Repeat PSA now  -KUB and testosterone-we " discussed shockwave lithotripsy if the stones are visible on his KUB  -Follow-up in 1 year with PSA prior to visit  -

## 2024-08-01 DIAGNOSIS — N20.0 KIDNEY STONES: Primary | ICD-10-CM

## 2024-08-08 ENCOUNTER — TELEPHONE (OUTPATIENT)
Dept: ADMINISTRATIVE | Facility: HOSPITAL | Age: 69
End: 2024-08-08

## 2024-08-08 DIAGNOSIS — N20.0 KIDNEY STONES: Primary | ICD-10-CM

## 2024-08-14 ENCOUNTER — HOSPITAL ENCOUNTER (OUTPATIENT)
Dept: ULTRASOUND IMAGING | Facility: HOSPITAL | Age: 69
Discharge: HOME | End: 2024-08-14
Payer: MEDICARE

## 2024-08-14 DIAGNOSIS — N20.0 KIDNEY STONES: ICD-10-CM

## 2024-08-14 PROCEDURE — 76775 US EXAM ABDO BACK WALL LIM: CPT

## 2024-08-26 ENCOUNTER — OFFICE VISIT (OUTPATIENT)
Dept: OBGYN CLINIC | Facility: CLINIC | Age: 69
End: 2024-08-26
Payer: MEDICARE

## 2024-08-26 VITALS
DIASTOLIC BLOOD PRESSURE: 76 MMHG | SYSTOLIC BLOOD PRESSURE: 160 MMHG | BODY MASS INDEX: 28.13 KG/M2 | WEIGHT: 185 LBS | HEART RATE: 60 BPM

## 2024-08-26 DIAGNOSIS — M19.011 PRIMARY OSTEOARTHRITIS OF RIGHT SHOULDER: ICD-10-CM

## 2024-08-26 PROCEDURE — 99203 OFFICE O/P NEW LOW 30 MIN: CPT | Performed by: PHYSICAL MEDICINE & REHABILITATION

## 2024-08-26 NOTE — PROGRESS NOTES
1. Primary osteoarthritis of right shoulder  Ambulatory Referral to Orthopedic Surgery        No orders of the defined types were placed in this encounter.       Impression:  This is a patient referred by Dr. Holman's team with right shoulder pain secondary to glenohumeral joint osteoarthritis. He has had previous Bankart repair 1997.      Owen's treatment has included intra-articular steroid injections and prescribed home exercise program.  Today we discussed platelet rich plasma injections.  We discussed that they are considered experimental and not covered by insurance.  They have good data for mild to moderate osteoarthritis and not so good for severe osteoarthritis.  With that said, Owen would still like to try this as his goal is to hold off on shoulder replacement.  He will avoid all anti-inflammatories for at least a few days before and as long after this injection.  We will see him back for this planned procedure.    Imaging Studies (I personally reviewed images in PACS and report):  Right shoulder x-rays most recent to this encounter reviewed.  These images show moderate-severe glenohumeral joint osteoarthritis as evidenced by nearly complete loss of joint space and osteophytosis.  Surgical anchors are noted.    No follow-ups on file.    Patient is in agreement with the above plan.    HPI:  Owen Gama is a 69 y.o. male  who presents for evaluation of No chief complaint on file.      History of present illness from referring clinician's notes reviewed.     Following history reviewed and updated:  Past Medical History:   Diagnosis Date    Coronary artery disease due to lipid rich plaque     Kidney stone      Past Surgical History:   Procedure Laterality Date    FOOT FRACTURE SURGERY Left 2016    HERNIA REPAIR Bilateral 2004    SHOULDER ARTHROSCOPY W/ BANKHART PROCEDURE Right 1991     Social History   Social History     Substance and Sexual Activity   Alcohol Use Yes    Comment: social     Social History      Substance and Sexual Activity   Drug Use Never     Social History     Tobacco Use   Smoking Status Never    Passive exposure: Past   Smokeless Tobacco Never     Family History   Problem Relation Age of Onset    Ovarian cancer Mother     Breast cancer Mother     Heart block Father         s/p CABG    Cancer Father         adenocarcinoma    Prostate cancer Paternal Grandfather      Allergies   Allergen Reactions    Shellfish Allergy - Food Allergy Anaphylaxis        Constitutional:  There were no vitals taken for this visit.   General: NAD.  Eyes: Anicteric sclerae.  Neck: Supple.  Lungs: Unlabored breathing.  Cardiovascular: No lower extremity edema.  Skin: Intact without erythema.  Neurologic: Sensation intact to light touch.  Psychiatric: Mood and affect are appropriate.    Right Shoulder Exam     Tenderness   The patient is experiencing tenderness in the acromion and biceps tendon.    Tests   Apprehension: positive  Her test: positive  Impingement: positive    Other   Erythema: absent  Scars: absent  Sensation: normal  Pulse: present             Procedures

## 2024-08-26 NOTE — PATIENT INSTRUCTIONS
PRP (Platelet Rich Plasma)      What is PRP?   Platelet?rich plasma is a fraction of your blood which contains a higher concentration of platelets than whole blood. PRP therapy involves the injection of this platelet rich concentrate, rich in growth factors and nutrients, to enhance the natural healing response of an injured tissue.      What are the different orthopedic conditions for which PRP may be used?   PRP therapy may be used for conditions like osteoarthritis (degenerative joint disease), chronic tendinitis or chronic ligament and muscle injuries. Some examples include knee osteoarthritis, lateral (tennis elbow) or medial epicondylitis (golfer’s elbow) of elbow, patellar or Achilles tendinitis, plantar fasciitis, chronic ligament sprains, etc.      Is PRP therapy right for you?   If you have tried conventional treatment methods (e.g., physical therapy, oral pain medications, bracing, etc.) and persist with chronic joint, muscle or tendon pain; then PRP therapy may be an option. Since every individual and condition is unique, it’s best to be clinically evaluated by your physician to determine if PRP is a good option for you.      What are some contraindications for PRP therapy?   It is best to discuss your medical conditions and medications with your physician prior to receiving PRP therapy. Some medical conditions may not be appropriate for PRP therapy e.g., blood disorders, anemia or low platelet count, immunological problems, cancer, active or prolonged infections, pregnancy, chronic smoking.   Certain medications may lower the efficacy of PRP therapy. Examples include aspirin, NSAID’s (Motrin, Advil, Aleve, Mobic/ ibuprofen, naproxen, diclofenac, meloxicam, etc.), blood thinners.       What is the scientific evidence for PRP therapy?   The scientific evidence for PRP is evolving. The position statement from [employer], published in 2021 indicates that PRP is effective in  reducing pain and improving function in patients with knee osteoarthritis, particularly in those with mild to moderate disease or younger individuals. Evidence for osteoarthritis of other joints is evolving. There is also some evidence for pain reduction in conditions such as lateral epicondylitis and other tendonitis. The evidence regarding PRP use for muscle or ligament injuries is limited.       What are the risks associated with PRP injection?   PRP injections are minimal risk since it is your own blood. Common risks include transient injection site increased pain, infection risk. You should discuss the potential risks for your PRP injection with your physician since it could vary based on your specific condition and injection site.      What can I do to optimize my PRP therapy?   Pre-procedure:    Discuss with your physician if you need to withhold any medications e.g., blood thinners, pain medications before the procedure.   Stay well hydrated on the day of procedure.   Inform physician of any recent medication change or infection.   If able to, perform some light exercise prior to the procedure. Some studies show improved platelet concentration following physical activity.   Post-procedure:   Avoid/ minimize taking NSAID’s and blood thinners as per the direction of your treating physician.   Keep the injection area clean and dry and avoid submerging under water e.g., bathtub, swimming pool, ocean water for 4-5 days. May take a shower the next day.   You may experience some increased discomfort in the area where PRP was injected. This may last from a few days to 2-3 weeks. You can apply local ice for comfort and discuss medication options with your physician for any increased pain.   It is advisable to have someone accompany you on the office visit as you may need help with driving following the injection.    Avoid strenuous exercise of the injected area until symptoms improve and following the guidance of  your treating physician/ physical therapist.   Contact the treating physician immediately if there is significant increase in injection area pain associated with spreading redness, fever, or chills.       How is the procedure performed?   A blood sample is taken from the patient like having a lab test for blood. This sample is then placed in a centrifuge machine which separates the blood into different components. One component, platelet rich plasm (PRP) is carefully extracted from the centrifuged sample. This is then injected in the tissue/joint of concern. Sterile precautions are maintained throughout the procedure. Your physician may use an ultrasound machine to accurately deliver the injection into the tissue/joint of concern.       Do I need to do physical therapy after the PRP injection?   Several studies have demonstrated synergistic effect of physical therapy with PRP injection. Physical therapy after PRP is typically customized based on patient’s individual scenario. Discuss with your physician about the need and duration of physical therapy following your PRP injection.      When can I expect improvement of symptoms, and will I need further PRP injections?   Symptom improvement can be variable among individuals. Commonly, most people will start noticing improvement in 6-8 weeks following the injection. If there is partial improvement of symptoms, you may opt for another PRP injection typically after 6-8 weeks.      Insurance coverage/Cost   Most traditional insurance companies currently do not cover the cost of PRP injection as it is not “FDA approved” and considered “FDA cleared”. Since PRP is derived from a patient’s own blood, it is not considered a drug by FDA. “FDA clearance” means that a device has been noted to be “substantially equivalent” to a currently marketed device. The cost of injection may vary depending on the type and number of PRP injections. Please confirm from your physician’s office  the exact cost and mode of payment for your PRP injection prior to scheduling the procedure.      Platelet-rich plasma (PRP) injection. In order to maximize the benefit from the growth factors released from the platelets, it is recommended not to use NSAIDs for a few days prior to your injection and for as long after the injection as can be tolerated. Some common NSAIDs include ibuprofen (Advil, Motrin), naproxen (Aleve), meloxicam (Mobic), diclofenac (Voltaren), and aspirin.    You may use acetaminophen (Tylenol), because this is not an NSAID and will not affect the PRP.

## 2024-09-24 ENCOUNTER — OFFICE VISIT (OUTPATIENT)
Dept: OBGYN CLINIC | Facility: CLINIC | Age: 69
End: 2024-09-24
Payer: MEDICARE

## 2024-09-24 VITALS — DIASTOLIC BLOOD PRESSURE: 77 MMHG | HEART RATE: 71 BPM | SYSTOLIC BLOOD PRESSURE: 131 MMHG

## 2024-09-24 DIAGNOSIS — M19.011 PRIMARY OSTEOARTHRITIS OF RIGHT SHOULDER: Primary | ICD-10-CM

## 2024-09-24 PROCEDURE — 20611 DRAIN/INJ JOINT/BURSA W/US: CPT | Performed by: PHYSICAL MEDICINE & REHABILITATION

## 2024-09-24 NOTE — PATIENT INSTRUCTIONS
PRP (Platelet Rich Plasma)      What is PRP?   Platelet?rich plasma is a fraction of your blood which contains a higher concentration of platelets than whole blood. PRP therapy involves the injection of this platelet rich concentrate, rich in growth factors and nutrients, to enhance the natural healing response of an injured tissue.      What are the different orthopedic conditions for which PRP may be used?   PRP therapy may be used for conditions like osteoarthritis (degenerative joint disease), chronic tendinitis or chronic ligament and muscle injuries. Some examples include knee osteoarthritis, lateral (tennis elbow) or medial epicondylitis (golfer’s elbow) of elbow, patellar or Achilles tendinitis, plantar fasciitis, chronic ligament sprains, etc.      Is PRP therapy right for you?   If you have tried conventional treatment methods (e.g., physical therapy, oral pain medications, bracing, etc.) and persist with chronic joint, muscle or tendon pain; then PRP therapy may be an option. Since every individual and condition is unique, it’s best to be clinically evaluated by your physician to determine if PRP is a good option for you.      What are some contraindications for PRP therapy?   It is best to discuss your medical conditions and medications with your physician prior to receiving PRP therapy. Some medical conditions may not be appropriate for PRP therapy e.g., blood disorders, anemia or low platelet count, immunological problems, cancer, active or prolonged infections, pregnancy, chronic smoking.   Certain medications may lower the efficacy of PRP therapy. Examples include aspirin, NSAID’s (Motrin, Advil, Aleve, Mobic/ ibuprofen, naproxen, diclofenac, meloxicam, etc.), blood thinners.       What is the scientific evidence for PRP therapy?   The scientific evidence for PRP is evolving. The position statement from [employer] Sports Medicine, published in 2021 indicates that PRP is effective in  reducing pain and improving function in patients with knee osteoarthritis, particularly in those with mild to moderate disease or younger individuals. Evidence for osteoarthritis of other joints is evolving. There is also some evidence for pain reduction in conditions such as lateral epicondylitis and other tendonitis. The evidence regarding PRP use for muscle or ligament injuries is limited.       What are the risks associated with PRP injection?   PRP injections are minimal risk since it is your own blood. Common risks include transient injection site increased pain, infection risk. You should discuss the potential risks for your PRP injection with your physician since it could vary based on your specific condition and injection site.      What can I do to optimize my PRP therapy?   Pre-procedure:    Discuss with your physician if you need to withhold any medications e.g., blood thinners, pain medications before the procedure.   Stay well hydrated on the day of procedure.   Inform physician of any recent medication change or infection.   If able to, perform some light exercise prior to the procedure. Some studies show improved platelet concentration following physical activity.   Post-procedure:   Avoid/ minimize taking NSAID’s and blood thinners as per the direction of your treating physician.   Keep the injection area clean and dry and avoid submerging under water e.g., bathtub, swimming pool, ocean water for 4-5 days. May take a shower the next day.   You may experience some increased discomfort in the area where PRP was injected. This may last from a few days to 2-3 weeks. You can apply local ice for comfort and discuss medication options with your physician for any increased pain.   It is advisable to have someone accompany you on the office visit as you may need help with driving following the injection.    Avoid strenuous exercise of the injected area until symptoms improve and following the guidance of  your treating physician/ physical therapist.   Contact the treating physician immediately if there is significant increase in injection area pain associated with spreading redness, fever, or chills.       How is the procedure performed?   A blood sample is taken from the patient like having a lab test for blood. This sample is then placed in a centrifuge machine which separates the blood into different components. One component, platelet rich plasm (PRP) is carefully extracted from the centrifuged sample. This is then injected in the tissue/joint of concern. Sterile precautions are maintained throughout the procedure. Your physician may use an ultrasound machine to accurately deliver the injection into the tissue/joint of concern.       Do I need to do physical therapy after the PRP injection?   Several studies have demonstrated synergistic effect of physical therapy with PRP injection. Physical therapy after PRP is typically customized based on patient’s individual scenario. Discuss with your physician about the need and duration of physical therapy following your PRP injection.      When can I expect improvement of symptoms, and will I need further PRP injections?   Symptom improvement can be variable among individuals. Commonly, most people will start noticing improvement in 6-8 weeks following the injection. If there is partial improvement of symptoms, you may opt for another PRP injection typically after 6-8 weeks.      Insurance coverage/Cost   Most traditional insurance companies currently do not cover the cost of PRP injection as it is not “FDA approved” and considered “FDA cleared”. Since PRP is derived from a patient’s own blood, it is not considered a drug by FDA. “FDA clearance” means that a device has been noted to be “substantially equivalent” to a currently marketed device. The cost of injection may vary depending on the type and number of PRP injections. Please confirm from your physician’s office  the exact cost and mode of payment for your PRP injection prior to scheduling the procedure.

## 2024-09-24 NOTE — PROGRESS NOTES
1. Primary osteoarthritis of right shoulder  Large joint arthrocentesis: R glenohumeral        Orders Placed This Encounter   Procedures    Large joint arthrocentesis: R glenohumeral        Impression:  Patient is here in follow up of right shoulder pain secondary to glenohumeral joint osteoarthritis. He has had previous Bankart repair 1997.  He was initially referred by Dr. Holman.     Owen's treatment has included intra-articular steroid injections and prescribed home exercise program. We previously discussed platelet rich plasma injections.  We discussed that they are considered experimental and not covered by insurance.  They have good data for mild to moderate osteoarthritis and not so good for severe osteoarthritis.  With that said, Owen would still like to try this as his goal is to delay shoulder replacement.  Today we completed an USG PRP glenohumeral joint injection.  He will continue to avoid all anti-inflammatories for as long as possible.  We will see him back in 6-8 weeks to reassess.     Imaging Studies (I personally reviewed images in PACS and report):  Right shoulder x-rays most recent to this encounter reviewed.  These images show moderate-severe glenohumeral joint osteoarthritis as evidenced by nearly complete loss of joint space and osteophytosis.  Surgical anchors are noted.    No follow-ups on file.    Patient is in agreement with the above plan.    HPI:  Owen Gama is a 69 y.o. male  who presents in follow up.  Here for   Chief Complaint   Patient presents with    Right Shoulder - Pain, Follow-up, Injections     PRP       Since last visit: See above.    Following history reviewed and updated:  Past Medical History:   Diagnosis Date    Coronary artery disease due to lipid rich plaque     Kidney stone      Past Surgical History:   Procedure Laterality Date    FOOT FRACTURE SURGERY Left 2016    HERNIA REPAIR Bilateral 2004    SHOULDER ARTHROSCOPY W/ BANKHART PROCEDURE Right 1991     Social  History   Social History     Substance and Sexual Activity   Alcohol Use Yes    Comment: social     Social History     Substance and Sexual Activity   Drug Use Never     Social History     Tobacco Use   Smoking Status Never    Passive exposure: Past   Smokeless Tobacco Never     Family History   Problem Relation Age of Onset    Ovarian cancer Mother     Breast cancer Mother     Heart block Father         s/p CABG    Cancer Father         adenocarcinoma    Prostate cancer Paternal Grandfather      Allergies   Allergen Reactions    Shellfish Allergy - Food Allergy Anaphylaxis        Constitutional:  /77   Pulse 71    General: NAD.  Eyes: Clear sclerae.  ENT: No inflammation, lesion, or mass of lips.  No tracheal deviation.  Musculoskeletal: As mentioned below.  Integumentary: No visible rashes or skin lesions.  Pulmonary/Chest: Effort normal. No respiratory distress.   Neuro: CN's grossly intact, BRAR.  Psych: Normal affect and judgement.  Vascular: WWP.    Right Shoulder Exam     Other   Erythema: absent  Scars: absent  Sensation: normal  Pulse: present             Large joint arthrocentesis: R glenohumeral  Universal Protocol:  Procedure performed by: (Medical Assistant)  Consent: Verbal consent obtained. Written consent not obtained.  Risks and benefits: risks, benefits and alternatives were discussed  Consent given by: patient  Timeout called at: 9/24/2024 10:43 AM.  Patient understanding: patient states understanding of the procedure being performed  Site marked: the operative site was marked  Radiology Images displayed and confirmed. If images not available, report reviewed: imaging studies available  Required items: required blood products, implants, devices, and special equipment available  Patient identity confirmed: verbally with patient  Supporting Documentation  Indications: pain   Procedure Details  Location: shoulder - R glenohumeral  Ultrasound guidance: yes (US guidance was used to visualize the  area of interest.  Please see below for details.)    Patient tolerance: patient tolerated the procedure well with no immediate complications  Dressing:  Sterile dressing applied    A venous blood draw was obtained from antecubital vein.  The blood sample was spun in the centrifuge and platelet rich plasma was obtained.  Patient had an injection of platelet-rich-plasma into the right glenohumeral joint.  A total of 4.4 ml's of PRP was obtained. The entirety of this PRP was easily infiltrated.    Ultrasound was used for accuracy.    Prior to the injection, the ultrasound was used to evaluate for any neural or vascular structures.  Care was taken to avoid these structures.    The images/videos were saved to the [employer] ultrasound system.

## 2024-11-18 ENCOUNTER — OFFICE VISIT (OUTPATIENT)
Dept: OBGYN CLINIC | Facility: CLINIC | Age: 69
End: 2024-11-18
Payer: MEDICARE

## 2024-11-18 VITALS
HEART RATE: 75 BPM | SYSTOLIC BLOOD PRESSURE: 140 MMHG | HEIGHT: 68 IN | WEIGHT: 185 LBS | BODY MASS INDEX: 28.04 KG/M2 | DIASTOLIC BLOOD PRESSURE: 86 MMHG

## 2024-11-18 DIAGNOSIS — M19.011 PRIMARY OSTEOARTHRITIS OF RIGHT SHOULDER: Primary | ICD-10-CM

## 2024-11-18 PROCEDURE — 99213 OFFICE O/P EST LOW 20 MIN: CPT | Performed by: PHYSICAL MEDICINE & REHABILITATION

## 2024-11-18 RX ORDER — EPINEPHRINE 0.15 MG/.3ML
0.15 INJECTION INTRAMUSCULAR ONCE
COMMUNITY
Start: 2024-09-01

## 2024-11-18 NOTE — PROGRESS NOTES
1. Primary osteoarthritis of right shoulder          No orders of the defined types were placed in this encounter.       Impression:  Patient is here in follow up of right shoulder pain secondary to glenohumeral joint osteoarthritis. He has had previous Bankart repair 1997.  He was initially referred by Dr. Holman.     Owen's treatment has included intra-articular steroid injections and prescribed home exercise program. We previously discussed platelet rich plasma injections.  We discussed that they are considered experimental and not covered by insurance.  They have good data for mild to moderate osteoarthritis and not so good for severe osteoarthritis.  With that said, Owen would still like to try this as his goal is to delay shoulder replacement.  Owen underwent USG PRP glenohumeral joint injection on 9/24/2024.  He has had mild improvement in his symptoms.  Will have him scheduled for a second injection and likely a third.     Imaging Studies (I personally reviewed images in PACS and report):  Right shoulder x-rays most recent to this encounter reviewed.  These images show moderate-severe glenohumeral joint osteoarthritis as evidenced by nearly complete loss of joint space and osteophytosis.  Surgical anchors are noted.    No follow-ups on file.    Patient is in agreement with the above plan.    HPI:  Owen Gama is a 69 y.o. male  who presents in follow up.  Here for   Chief Complaint   Patient presents with    Right Shoulder - Follow-up, Pain     Pt reports some relief post PRP       Since last visit: See above.    Following history reviewed and updated:  Past Medical History:   Diagnosis Date    Coronary artery disease due to lipid rich plaque     Kidney stone      Past Surgical History:   Procedure Laterality Date    FOOT FRACTURE SURGERY Left 2016    HERNIA REPAIR Bilateral 2004    SHOULDER ARTHROSCOPY W/ BANKHART PROCEDURE Right 1991     Social History   Social History     Substance and Sexual Activity  "  Alcohol Use Yes    Comment: social     Social History     Substance and Sexual Activity   Drug Use Never     Social History     Tobacco Use   Smoking Status Never    Passive exposure: Past   Smokeless Tobacco Never     Family History   Problem Relation Age of Onset    Ovarian cancer Mother     Breast cancer Mother     Heart block Father         s/p CABG    Cancer Father         adenocarcinoma    Prostate cancer Paternal Grandfather      Allergies   Allergen Reactions    Shellfish Allergy - Food Allergy Anaphylaxis        Constitutional:  /86   Pulse 75   Ht 5' 7.99\" (1.727 m)   Wt 83.9 kg (185 lb)   BMI 28.14 kg/m²    General: NAD.  Eyes: Clear sclerae.  ENT: No inflammation, lesion, or mass of lips.  No tracheal deviation.  Musculoskeletal: As mentioned below.  Integumentary: No visible rashes or skin lesions.  Pulmonary/Chest: Effort normal. No respiratory distress.   Neuro: CN's grossly intact, BRAR.  Psych: Normal affect and judgement.  Vascular: WWP.    Ortho Exam     Procedures  "

## 2024-12-04 ENCOUNTER — TELEPHONE (OUTPATIENT)
Age: 69
End: 2024-12-04

## 2024-12-04 NOTE — TELEPHONE ENCOUNTER
Received call from Patient asking to cancel 12/4/24 12:40 appt with Dr. Valero in [place]. Patient is in [place] and it is over an hour away so he cannot make it in time.     Cancelled appt per patient's request.     Patient verbalized he may call back to reschedule.

## 2024-12-17 ENCOUNTER — PROCEDURE VISIT (OUTPATIENT)
Dept: OBGYN CLINIC | Facility: CLINIC | Age: 69
End: 2024-12-17
Payer: MEDICARE

## 2024-12-17 VITALS
SYSTOLIC BLOOD PRESSURE: 149 MMHG | HEIGHT: 68 IN | BODY MASS INDEX: 28.04 KG/M2 | DIASTOLIC BLOOD PRESSURE: 80 MMHG | WEIGHT: 185 LBS | HEART RATE: 54 BPM

## 2024-12-17 DIAGNOSIS — M19.011 PRIMARY OSTEOARTHRITIS OF RIGHT SHOULDER: Primary | ICD-10-CM

## 2024-12-17 PROCEDURE — 20611 DRAIN/INJ JOINT/BURSA W/US: CPT | Performed by: PHYSICAL MEDICINE & REHABILITATION

## 2024-12-17 NOTE — PROGRESS NOTES
1. Primary osteoarthritis of right shoulder          Orders Placed This Encounter   Procedures    Large joint arthrocentesis        Impression:  Patient is here in follow up of right shoulder pain secondary to glenohumeral joint osteoarthritis. He has had previous Bankart repair 1997.  He was initially referred by Dr. Holman.     Owen's treatment has included intra-articular steroid injections and prescribed home exercise program. We previously discussed platelet rich plasma injections.  We discussed that they are considered experimental and not covered by insurance.  They have good data for mild to moderate osteoarthritis and not so good for severe osteoarthritis.  With that said, Owen would still like to try this as his goal is to delay shoulder replacement.  Owen underwent USG PRP glenohumeral joint injection on 9/24/2024.  We repeated this today.  He will present in about 1 month for the third and final PRP injection into the glenohumeral joint. If doing well, he can cancel.     Imaging Studies (I personally reviewed images in PACS and report):  Right shoulder x-rays most recent to this encounter reviewed.  These images show moderate-severe glenohumeral joint osteoarthritis as evidenced by nearly complete loss of joint space and osteophytosis.  Surgical anchors are noted.    No follow-ups on file.    Patient is in agreement with the above plan.    HPI:  Owen Gama is a 69 y.o. male  who presents in follow up.  Here for   Chief Complaint   Patient presents with    Right Shoulder - Pain, Follow-up     PRP       Since last visit: See above.    Following history reviewed and updated:  Past Medical History:   Diagnosis Date    Coronary artery disease due to lipid rich plaque     Kidney stone      Past Surgical History:   Procedure Laterality Date    FOOT FRACTURE SURGERY Left 2016    HERNIA REPAIR Bilateral 2004    SHOULDER ARTHROSCOPY W/ BANKHART PROCEDURE Right 1991     Social History   Social History  "    Substance and Sexual Activity   Alcohol Use Yes    Comment: social     Social History     Substance and Sexual Activity   Drug Use Never     Social History     Tobacco Use   Smoking Status Never    Passive exposure: Past   Smokeless Tobacco Never     Family History   Problem Relation Age of Onset    Ovarian cancer Mother     Breast cancer Mother     Heart block Father         s/p CABG    Cancer Father         adenocarcinoma    Prostate cancer Paternal Grandfather      Allergies   Allergen Reactions    Shellfish Allergy - Food Allergy Anaphylaxis        Constitutional:  /80   Pulse (!) 54   Ht 5' 7.99\" (1.727 m)   Wt 83.9 kg (185 lb)   BMI 28.14 kg/m²    General: NAD.  Eyes: Clear sclerae.  ENT: No inflammation, lesion, or mass of lips.  No tracheal deviation.  Musculoskeletal: As mentioned below.  Integumentary: No visible rashes or skin lesions.  Pulmonary/Chest: Effort normal. No respiratory distress.   Neuro: CN's grossly intact, BRAR.  Psych: Normal affect and judgement.  Vascular: WWP.    Right Shoulder Exam     Other   Erythema: absent  Scars: absent  Sensation: normal  Pulse: present             Large joint arthrocentesis: R glenohumeral  Universal Protocol:  Procedure performed by: (Medical Assistant)  Consent: Verbal consent obtained. Written consent not obtained.  Risks and benefits: risks, benefits and alternatives were discussed  Consent given by: patient  Timeout called at: 12/17/2024 11:06 AM.  Patient understanding: patient states understanding of the procedure being performed  Site marked: the operative site was marked  Radiology Images displayed and confirmed. If images not available, report reviewed: imaging studies available  Required items: required blood products, implants, devices, and special equipment available  Patient identity confirmed: verbally with patient  Supporting Documentation  Indications: pain   Procedure Details  Location: shoulder - R glenohumeral  Ultrasound " guidance: yes (US guidance was used to visualize the area of interest.  Please see below for details.)    Patient tolerance: patient tolerated the procedure well with no immediate complications  Dressing:  Sterile dressing applied    A venous blood draw was obtained from antecubital vein.  The blood sample was spun in the centrifuge and platelet rich plasma was obtained.  Patient had an injection of platelet-rich-plasma into the right glenohumeral joint.  A total of 6 ml's of PRP was obtained. The entirety of this PRP was easily infiltrated.    Ultrasound was used for accuracy.    Prior to the injection, the ultrasound was used to evaluate for any neural or vascular structures.  Care was taken to avoid these structures.    The images/videos were saved to the [employer] ultrasound system.

## 2025-08-05 ENCOUNTER — OFFICE VISIT (OUTPATIENT)
Dept: UROLOGY | Facility: CLINIC | Age: 70
End: 2025-08-05
Payer: MEDICARE

## 2025-08-05 VITALS
OXYGEN SATURATION: 96 % | BODY MASS INDEX: 27.34 KG/M2 | SYSTOLIC BLOOD PRESSURE: 118 MMHG | HEART RATE: 60 BPM | WEIGHT: 180.4 LBS | DIASTOLIC BLOOD PRESSURE: 84 MMHG | HEIGHT: 68 IN

## 2025-08-05 DIAGNOSIS — N20.0 KIDNEY STONES: Primary | ICD-10-CM

## 2025-08-05 DIAGNOSIS — N40.0 BENIGN PROSTATIC HYPERPLASIA WITHOUT LOWER URINARY TRACT SYMPTOMS: ICD-10-CM

## 2025-08-05 PROCEDURE — 99213 OFFICE O/P EST LOW 20 MIN: CPT | Performed by: PHYSICIAN ASSISTANT
